# Patient Record
Sex: FEMALE | Race: WHITE | NOT HISPANIC OR LATINO
[De-identification: names, ages, dates, MRNs, and addresses within clinical notes are randomized per-mention and may not be internally consistent; named-entity substitution may affect disease eponyms.]

---

## 2017-01-09 ENCOUNTER — APPOINTMENT (OUTPATIENT)
Dept: ORTHOPEDIC SURGERY | Facility: CLINIC | Age: 67
End: 2017-01-09

## 2017-01-09 DIAGNOSIS — M65.331 TRIGGER FINGER, RIGHT MIDDLE FINGER: ICD-10-CM

## 2017-04-13 ENCOUNTER — TRANSCRIPTION ENCOUNTER (OUTPATIENT)
Age: 67
End: 2017-04-13

## 2017-06-06 ENCOUNTER — OTHER (OUTPATIENT)
Age: 67
End: 2017-06-06

## 2017-06-06 ENCOUNTER — MOBILE ON CALL (OUTPATIENT)
Age: 67
End: 2017-06-06

## 2018-01-09 ENCOUNTER — TRANSCRIPTION ENCOUNTER (OUTPATIENT)
Age: 68
End: 2018-01-09

## 2018-05-21 ENCOUNTER — TRANSCRIPTION ENCOUNTER (OUTPATIENT)
Age: 68
End: 2018-05-21

## 2018-10-23 ENCOUNTER — TRANSCRIPTION ENCOUNTER (OUTPATIENT)
Age: 68
End: 2018-10-23

## 2021-05-03 ENCOUNTER — APPOINTMENT (OUTPATIENT)
Dept: OTOLARYNGOLOGY | Facility: CLINIC | Age: 71
End: 2021-05-03
Payer: MEDICARE

## 2021-05-03 ENCOUNTER — APPOINTMENT (OUTPATIENT)
Dept: OTOLARYNGOLOGY | Facility: CLINIC | Age: 71
End: 2021-05-03
Payer: SELF-PAY

## 2021-05-03 DIAGNOSIS — H93.12 TINNITUS, LEFT EAR: ICD-10-CM

## 2021-05-03 DIAGNOSIS — Z87.39 PERSONAL HISTORY OF OTHER DISEASES OF THE MUSCULOSKELETAL SYSTEM AND CONNECTIVE TISSUE: ICD-10-CM

## 2021-05-03 PROCEDURE — 92567 TYMPANOMETRY: CPT

## 2021-05-03 PROCEDURE — 92557 COMPREHENSIVE HEARING TEST: CPT

## 2021-05-03 PROCEDURE — 99203 OFFICE O/P NEW LOW 30 MIN: CPT

## 2021-05-03 PROCEDURE — V5010 ASSESSMENT FOR HEARING AID: CPT

## 2021-05-03 RX ORDER — METOPROLOL SUCCINATE 25 MG/1
25 TABLET, EXTENDED RELEASE ORAL
Qty: 180 | Refills: 0 | Status: ACTIVE | COMMUNITY
Start: 2021-04-30

## 2021-05-03 RX ORDER — ROSUVASTATIN CALCIUM 10 MG/1
10 TABLET, FILM COATED ORAL
Qty: 90 | Refills: 0 | Status: ACTIVE | COMMUNITY
Start: 2021-04-06

## 2021-05-03 NOTE — CONSULT LETTER
[Dear  ___] : Dear  [unfilled], [Consult Letter:] : I had the pleasure of evaluating your patient, [unfilled]. [Please see my note below.] : Please see my note below. [Consult Closing:] : Thank you very much for allowing me to participate in the care of this patient.  If you have any questions, please do not hesitate to contact me. [Sincerely,] : Sincerely, [FreeTextEntry3] : Krystal Sequeira MD\par

## 2021-05-03 NOTE — HISTORY OF PRESENT ILLNESS
[de-identified] : WILLIAM CENTENO is a 70 year patient With a history of hearing loss. She said that she has a long history of hearing loss in the left ear. She does not recall a preceding event. In the fall, she saw Dr. Diane, for ENT at [facility] for evaluation for dizziness. She was diagnosed with a bilateral sensorineural hearing loss with asymmetry of the left ear. She said that the dizziness has improved. She described a spinning sensation.  the episodes could last all day. She has not had an episode recently.  She feels a fullness in the ear and may have tinnitus. She has no otalgia or otorrhea. She denies a history of headaches or known change in her hearing. She has not been using hearing aids. She does have a history of autoimmune disease and is on prednisone. She states she was diagnosed with Ménière's disease. She denies a history of recurrent ear infections, prior otologic surgery, or ear/head trauma. She does have noise exposure from using headphones. She said that her sister has Ménière's disease.  I reviewed her audiogram from [facility] from October. She said that an MRI was negative for masses.

## 2021-05-03 NOTE — ASSESSMENT
[FreeTextEntry1] : She has bilateral sensorineural hearing loss with asymmetry in the left ear.  She had also episode of dizziness but those have improved and she has not had one in a while. She may have Ménière's disease. She does have autoimmune disease. \par \par PLAN\par \par -findings and management options discussed in detail with the patient. \par -good aural hygiene\par -avoid using cotton swabs in the ears\par -noise precautions\par -monitor hearing\par -I recommended low salt diet and avoidance of caffeine, alcohol and nicotine. She may take meclizine as needed for severe vertigo. If she has recurrent dizziness that is frequent or severe, we would also discuss trying a diuretic\par -I recommended hearing aid evaluation. Depending on how she does, she could consider a BAHA.\par -follow up in 6 months if she is doing well.\par -call and return earlier if any concerns or worsening symptoms \par \par \par

## 2021-05-11 ENCOUNTER — APPOINTMENT (OUTPATIENT)
Dept: OTOLARYNGOLOGY | Facility: CLINIC | Age: 71
End: 2021-05-11
Payer: SELF-PAY

## 2021-05-11 PROCEDURE — V5010 ASSESSMENT FOR HEARING AID: CPT | Mod: NC

## 2021-05-25 ENCOUNTER — APPOINTMENT (OUTPATIENT)
Dept: OTOLARYNGOLOGY | Facility: CLINIC | Age: 71
End: 2021-05-25

## 2021-05-25 ENCOUNTER — TRANSCRIPTION ENCOUNTER (OUTPATIENT)
Age: 71
End: 2021-05-25

## 2021-06-05 ENCOUNTER — TRANSCRIPTION ENCOUNTER (OUTPATIENT)
Age: 71
End: 2021-06-05

## 2021-06-07 ENCOUNTER — APPOINTMENT (OUTPATIENT)
Dept: OTOLARYNGOLOGY | Facility: CLINIC | Age: 71
End: 2021-06-07

## 2021-06-07 ENCOUNTER — NON-APPOINTMENT (OUTPATIENT)
Age: 71
End: 2021-06-07

## 2021-06-09 ENCOUNTER — TRANSCRIPTION ENCOUNTER (OUTPATIENT)
Age: 71
End: 2021-06-09

## 2021-07-13 ENCOUNTER — APPOINTMENT (OUTPATIENT)
Dept: OTOLARYNGOLOGY | Facility: CLINIC | Age: 71
End: 2021-07-13
Payer: SELF-PAY

## 2021-07-13 PROCEDURE — V5011: CPT | Mod: NC

## 2021-08-05 ENCOUNTER — APPOINTMENT (OUTPATIENT)
Dept: OTOLARYNGOLOGY | Facility: CLINIC | Age: 71
End: 2021-08-05
Payer: MEDICARE

## 2021-08-05 PROCEDURE — 99212 OFFICE O/P EST SF 10 MIN: CPT | Mod: 95

## 2021-08-05 RX ORDER — PREDNISONE 2.5 MG/1
2.5 TABLET ORAL
Qty: 30 | Refills: 0 | Status: COMPLETED | COMMUNITY
Start: 2020-12-27 | End: 2021-08-05

## 2021-08-05 RX ORDER — PREDNISONE 5 MG/1
5 TABLET ORAL
Qty: 90 | Refills: 0 | Status: COMPLETED | COMMUNITY
Start: 2020-11-10 | End: 2021-08-05

## 2021-08-05 RX ORDER — FUROSEMIDE 20 MG/1
20 TABLET ORAL
Qty: 3 | Refills: 0 | Status: COMPLETED | COMMUNITY
Start: 2021-02-05 | End: 2021-08-05

## 2021-08-05 RX ORDER — DILTIAZEM HYDROCHLORIDE 240 MG/1
240 CAPSULE, EXTENDED RELEASE ORAL
Qty: 90 | Refills: 0 | Status: COMPLETED | COMMUNITY
Start: 2020-08-19 | End: 2021-08-05

## 2021-08-05 RX ORDER — SUCRALFATE 1 G/1
1 TABLET ORAL
Qty: 56 | Refills: 0 | Status: COMPLETED | COMMUNITY
Start: 2021-02-05 | End: 2021-08-05

## 2021-08-05 RX ORDER — PANTOPRAZOLE 40 MG/1
40 TABLET, DELAYED RELEASE ORAL
Qty: 30 | Refills: 0 | Status: COMPLETED | COMMUNITY
Start: 2021-02-05 | End: 2021-08-05

## 2021-08-05 RX ORDER — PREDNISONE 1 MG/1
1 TABLET ORAL
Qty: 120 | Refills: 0 | Status: COMPLETED | COMMUNITY
Start: 2021-01-29 | End: 2021-08-05

## 2021-08-05 NOTE — ASSESSMENT
[FreeTextEntry1] : She has had recurrent vertigo over the past 2 nights. She has a spinning sensation of nausea and vomiting. It lasts several hours. She also has fatigue and feels that her heart rate has increased. She does not think that she is dehydrated. She denies neurological symptoms, shortness of breath and chest pain. She also denies a change in her hearing, tinnitus, or fullness in the ear.\par \par Plan\par -Findings and management options were discussed with the patient.\par - I have asked her to call her cardiologist to discuss her symptoms. I am concerned because of the elevated heart rate. She should also reach out to her PCP. She said that he is away\par -She may try meclizine. I sent in a new prescription as her may be \par -Continue low salt diet and avoidance of caffeine, alcohol, and nicotine.\par -consider trying a diuretic depending on how she does. We would need to speak to her cardiologist first\par -She could try lose dose Valium if needed. She said that she has Xanax to take at home to take as needed\par -I to told her to go to the ER if she has severe symptoms or any concerns.\par -I asked her to call me tomorrow to let me know how she is doing. I would like to see her in the office when she can come in.

## 2021-08-05 NOTE — HISTORY OF PRESENT ILLNESS
[Home] : at home, [unfilled] , at the time of the visit. [Medical Office: ([facility])___] : at the medical office located in  [Verbal consent obtained from patient] : the patient, [unfilled] [de-identified] : Visit initiated at patient request.  This is an audio/visual (using [employer]) visit. There are limitations of telemedicine encounters, including the risks associated with the technology platform  and a limited physical exam.  There is also a limitation in performing diagnostic procedures and patient may need further testing and work up to arrive at a diagnosis and treatment plan. She was told when she scheduled an appointment that this would be billed as a visit\par \kendall WILLIAM CENTENO is a 70 year patient With a history of hearing loss and Ménière's disease. She said that on Tuesday night at 11 PM, she had a severe episode of vertigo with nausea. It improved somewhat after a while. She then had a recurrent episode last night along with nausea and vomiting. Again, it lasted a while and improved.  She was not able to come into the office for evaluation. She also has fatigue. She has no change in her hearing, fullness, tinnitus, visual changes, headaches, or lightheadedness. She has noticed an increase in her heart rate. She has not tried meclizine. She had been on steroids at her last visit but has been weaned off them. She has not been sick. She denies neurological symptoms. She has not yet spoken with her PCP or cardiologist.\par

## 2021-08-06 ENCOUNTER — APPOINTMENT (OUTPATIENT)
Dept: OTOLARYNGOLOGY | Facility: CLINIC | Age: 71
End: 2021-08-06
Payer: MEDICARE

## 2021-08-06 DIAGNOSIS — H90.A32 MIXED CONDUCTIVE AND SENSORINEURAL HEARING, UNILATERAL, LEFT EAR WITH RESTRICTED HEARING ON THE  CONTRALATERAL SIDE: ICD-10-CM

## 2021-08-06 PROCEDURE — 92567 TYMPANOMETRY: CPT

## 2021-08-06 PROCEDURE — 92557 COMPREHENSIVE HEARING TEST: CPT

## 2021-08-06 PROCEDURE — 99214 OFFICE O/P EST MOD 30 MIN: CPT

## 2021-08-06 NOTE — HISTORY OF PRESENT ILLNESS
[de-identified] : WILLIAM CENTENO is a 70 year patient With a history of left Ménière's disease. She has hearing loss in that ear. She started having severe episodes of vertigo 11:00 at night starting Tuesday which last several hours. Her left ear feels like there is fullness but it is unchanged. She tried taking 12.5 mg of meclizine before bed. The nausea and vomiting are bit less but she still had an episode of dizziness. She also took a low dose Xanax which helped. She also felt like her heart was racing. She spoke with her cardiologist who recommended evaluation and possible Holter monitoring.  She has no other symptoms. \par \par ENT HIstory\par She has left Meniere's disease.  She was seen at [facility]. MRI negative for masses per the patient\par No recurrent ear infections, prior otologic surgery, or ear/head trauma\par She has history of noise exposure. \par she has autoimmune disease. \par Her sister has Meniere's disease.

## 2021-08-06 NOTE — CONSULT LETTER
[Dear  ___] : Dear  [unfilled], [Courtesy Letter:] : I had the pleasure of seeing your patient, [unfilled], in my office today. [Please see my note below.] : Please see my note below. [Consult Closing:] : Thank you very much for allowing me to participate in the care of this patient.  If you have any questions, please do not hesitate to contact me. [Sincerely,] : Sincerely, [FreeTextEntry3] : Krystal Sequeira MD\par

## 2021-08-06 NOTE — ASSESSMENT
[FreeTextEntry1] : She has a history of left Ménière's disease. She has been having episodes of severe vertigo at night which last several hours. This may be an exacerbation of Meniere's. Her ears were normal on exam and audiogram showed no significant change although the word understanding was a little bit worse on the left side. I discussed the possibility of benign positional vertigo. She opted to hold off on Perris-Hallpike testing today since she was by herself.\par \par PLAN\par \par -findings and management options discussed in detail with the patient. \par -good aural hygiene\par -monitor hearing\par -low salt diet (she was given literature regarding the salt content of foods) and avoidance of caffeine, alcohol and nicotine.\par -she may try taking 25 mg of meclizine as needed for the dizziness. She has Xanax as well although she should avoid taking them together because of the sedation effect\par -I spoke with her cardiologist after the visit. He said it would be okay to try Dyazide. I will have her contacted. She should review the side effects. She needs to be cautious about the potassium content of foods.\par -She does not wish to take oral steroids. She may consider intratympanic steroid injection.\par -I am sending her for VNG and ECoG testing. If positional vertigo is found, vestibular therapy would be helpful\par -She may consider intratympanic gentamycin injection. I will have her see my colleague, Dr. Young, an otologist who performs the injection \par -Followup with her cardiologist although he thought it was less likely to be cardiac in nature as it occurs typically at night at 11 PM\par -We'll see how she is doing next week. I will have her call me Monday with an update. \par -Call if she has any worsening symptoms or concerns. She has any issues, she should also consider going to the emergency room\par \par \par

## 2021-08-09 ENCOUNTER — NON-APPOINTMENT (OUTPATIENT)
Age: 71
End: 2021-08-09

## 2021-08-11 ENCOUNTER — APPOINTMENT (OUTPATIENT)
Dept: OTOLARYNGOLOGY | Facility: CLINIC | Age: 71
End: 2021-08-11
Payer: MEDICARE

## 2021-08-11 DIAGNOSIS — R42 DIZZINESS AND GIDDINESS: ICD-10-CM

## 2021-08-11 PROCEDURE — 99442: CPT | Mod: 95

## 2021-08-11 NOTE — HISTORY OF PRESENT ILLNESS
[de-identified] : Telephone visit.  There are limitations of telemedicine encounters including the risks associated with the technology platform and lack of a physical exam.  The patient may need further testing and work up to arrive at a diagnosis and treatment plan.  The patient was made aware when the appointment was scheduled that this will be billed as a visit.  The patient understood and elected to proceed.\par \par WILLIAM CENTENO is a 70 year patient With a history of left Ménière's disease. She had exacerbation of her symptoms with vertigo occurring nightly for several days. She did go for vestibular therapy evaluation. I reviewed the consult. She has been feeling better over the past 2 days. Her last attack was Sunday night. She feels normal at this time. She took meclizine which did help. She took the diuretic for one day day but stopped it because she felt better. She is on a low-salt diet.

## 2021-08-11 NOTE — ASSESSMENT
Unable to leave . A no phone response letter has been formulated due to not being able to leave . [FreeTextEntry1] : She has a history of Ménière's disease. She has been feeling better. Her last episode was Sunday night..\par \par Plan\par -Findings and management options were discussed with the patient.\par - monitor hearing\par - low salt diet\par - I recommend she start the diuretic if she has recurrent dizziness and take it daily for one month\par -Continue low salt diet and avoidance of caffeine, alcohol, and nicotine.\par - vestibular therapy and/or home exercises\par - She would like to hold off on VNG and ECoG testing as it could cause exacerbation of dizziness. If she does have recurrent dizziness, I recommend she proceed with the testing\par - I also asked her to consider intratympanic steroid injection if she has recurrent dizziness. She does not wish to take oral steroids, she may also consider intratympanic gentamycin injection if her symptoms do not respond.\par -She will call me if she has recurrent symptoms. If she is doing well, I recommend followup in 3 months

## 2021-08-24 ENCOUNTER — TRANSCRIPTION ENCOUNTER (OUTPATIENT)
Age: 71
End: 2021-08-24

## 2021-08-24 ENCOUNTER — APPOINTMENT (OUTPATIENT)
Dept: OTOLARYNGOLOGY | Facility: CLINIC | Age: 71
End: 2021-08-24

## 2021-09-16 ENCOUNTER — TRANSCRIPTION ENCOUNTER (OUTPATIENT)
Age: 71
End: 2021-09-16

## 2021-09-23 ENCOUNTER — TRANSCRIPTION ENCOUNTER (OUTPATIENT)
Age: 71
End: 2021-09-23

## 2021-10-20 ENCOUNTER — NON-APPOINTMENT (OUTPATIENT)
Age: 71
End: 2021-10-20

## 2021-10-21 ENCOUNTER — NON-APPOINTMENT (OUTPATIENT)
Age: 71
End: 2021-10-21

## 2021-10-26 ENCOUNTER — APPOINTMENT (OUTPATIENT)
Dept: OTOLARYNGOLOGY | Facility: CLINIC | Age: 71
End: 2021-10-26
Payer: SELF-PAY

## 2021-10-26 PROCEDURE — V5261I: CUSTOM

## 2021-11-09 ENCOUNTER — APPOINTMENT (OUTPATIENT)
Dept: OTOLARYNGOLOGY | Facility: CLINIC | Age: 71
End: 2021-11-09
Payer: SELF-PAY

## 2021-11-09 PROCEDURE — 92593: CPT | Mod: NC

## 2021-11-15 ENCOUNTER — APPOINTMENT (OUTPATIENT)
Dept: OTOLARYNGOLOGY | Facility: CLINIC | Age: 71
End: 2021-11-15
Payer: MEDICARE

## 2021-11-15 PROCEDURE — 69210 REMOVE IMPACTED EAR WAX UNI: CPT

## 2021-11-15 PROCEDURE — 92557 COMPREHENSIVE HEARING TEST: CPT

## 2021-11-15 PROCEDURE — 92567 TYMPANOMETRY: CPT

## 2021-11-15 PROCEDURE — 99214 OFFICE O/P EST MOD 30 MIN: CPT | Mod: 25

## 2021-11-15 NOTE — CONSULT LETTER
[Please see my note below.] : Please see my note below. [FreeTextEntry2] : Dear DENNY NORMAN  [FreeTextEntry1] : Thank you for allowing me to participate in the care of WILLIAM CENTENO .\par Please see the attached visit note.\par \par \par \par Sha Young\par Otology\par Medical Director of [facility]\par Department of Otolaryngology\par [facility]

## 2021-11-15 NOTE — ASSESSMENT
[FreeTextEntry1] : Symptoms are most consistent with Ménière's disease in the left ear. Recent onset of vertigo appears to have occurred in the face of a pre-existing sensory neural hearing loss of uncertain etiology or duration.\par \par I have requested an opportunity to review a previous MRI which was reportedly normal.\par \par I have reinforced a low-sodium diet. I have recommended a trial of diuretics and have provided her with abortive medication for vertigo. Attacks.\par \par Consideration of other interventions were discussed if symptoms persist or progress.\par \par Followup recommended in 6 weeks.\par \par Time based billing:  I have spent 30 minutes of time, excluding procedures on this encounter.

## 2021-11-15 NOTE — REASON FOR VISIT
[Initial Consultation] : an initial consultation for [Hearing Loss] : hearing loss [Vertigo] : vertigo

## 2021-11-15 NOTE — HISTORY OF PRESENT ILLNESS
[de-identified] : WILLIAM CENTENO has a history of Meniere's disease for several years in or about 2020.  Episodic vertigo began. \par reports 2 severe attacks in the past 6 months. Gradual hearing noise of unknown duration. \par No tinnitus. No hearing fluctuations\par \par reports negative MRI recently\par FH: sister has Meniere's disease

## 2021-11-15 NOTE — DATA REVIEWED
[de-identified] : In order to investigate current symptoms, Complete audiometry was ordered and completed today. I have interpreted these results and reviewed them in detail with the patient.\par \par

## 2021-11-16 ENCOUNTER — APPOINTMENT (OUTPATIENT)
Dept: OTOLARYNGOLOGY | Facility: CLINIC | Age: 71
End: 2021-11-16

## 2021-11-18 ENCOUNTER — APPOINTMENT (OUTPATIENT)
Dept: OTOLARYNGOLOGY | Facility: CLINIC | Age: 71
End: 2021-11-18

## 2021-11-30 ENCOUNTER — APPOINTMENT (OUTPATIENT)
Dept: OTOLARYNGOLOGY | Facility: CLINIC | Age: 71
End: 2021-11-30
Payer: SELF-PAY

## 2021-11-30 PROCEDURE — 92593: CPT | Mod: NC

## 2021-12-03 ENCOUNTER — APPOINTMENT (OUTPATIENT)
Dept: OTOLARYNGOLOGY | Facility: CLINIC | Age: 71
End: 2021-12-03
Payer: SELF-PAY

## 2021-12-03 PROCEDURE — 92593: CPT | Mod: NC

## 2021-12-07 ENCOUNTER — APPOINTMENT (OUTPATIENT)
Dept: OTOLARYNGOLOGY | Facility: CLINIC | Age: 71
End: 2021-12-07

## 2021-12-10 ENCOUNTER — NON-APPOINTMENT (OUTPATIENT)
Age: 71
End: 2021-12-10

## 2021-12-22 ENCOUNTER — APPOINTMENT (OUTPATIENT)
Dept: OTOLARYNGOLOGY | Facility: CLINIC | Age: 71
End: 2021-12-22
Payer: MEDICARE

## 2021-12-22 VITALS — TEMPERATURE: 97.6 F | HEIGHT: 61 IN | WEIGHT: 145 LBS | BODY MASS INDEX: 27.38 KG/M2

## 2021-12-22 PROCEDURE — 92557 COMPREHENSIVE HEARING TEST: CPT

## 2021-12-22 PROCEDURE — 99213 OFFICE O/P EST LOW 20 MIN: CPT

## 2021-12-22 PROCEDURE — 92567 TYMPANOMETRY: CPT

## 2021-12-22 NOTE — ASSESSMENT
[FreeTextEntry1] : Significantly improved symptoms. Following the onset of diuretic use and dietary restriction. I have recommended continuing this regimen. Clinical monitoring recommended.\par \par Followup in 3 months with repeat audiometry.

## 2021-12-22 NOTE — HISTORY OF PRESENT ILLNESS
[de-identified] : WILLIAM CENTENO has a history of Meniere's disease for several years in or about 2020.  Episodic vertigo began. \par reports 2 severe attacks in the past 6 months. Gradual hearing noise of unknown duration. \par No tinnitus. No hearing fluctuations\par \par reports negative MRI recently\par FH: sister has Meniere's disease [FreeTextEntry1] : 12/22/2021 \par No reported vertigo or any mild dizziness. \par compliant with dietary changes and diuretic.

## 2021-12-22 NOTE — DATA REVIEWED
[de-identified] : In order to investigate current symptoms, Complete audiometry was ordered and completed today. I have interpreted these results and reviewed them in detail with the patient.\par \par mproved hearing in the left ear

## 2021-12-23 ENCOUNTER — NON-APPOINTMENT (OUTPATIENT)
Age: 71
End: 2021-12-23

## 2021-12-28 ENCOUNTER — TRANSCRIPTION ENCOUNTER (OUTPATIENT)
Age: 71
End: 2021-12-28

## 2022-01-04 ENCOUNTER — NON-APPOINTMENT (OUTPATIENT)
Age: 72
End: 2022-01-04

## 2022-01-08 ENCOUNTER — TRANSCRIPTION ENCOUNTER (OUTPATIENT)
Age: 72
End: 2022-01-08

## 2022-01-12 ENCOUNTER — APPOINTMENT (OUTPATIENT)
Dept: OTOLARYNGOLOGY | Facility: CLINIC | Age: 72
End: 2022-01-12
Payer: MEDICARE

## 2022-01-12 ENCOUNTER — APPOINTMENT (OUTPATIENT)
Dept: OTOLARYNGOLOGY | Facility: CLINIC | Age: 72
End: 2022-01-12

## 2022-01-12 VITALS — BODY MASS INDEX: 27.38 KG/M2 | HEIGHT: 61 IN | TEMPERATURE: 97.3 F | WEIGHT: 145 LBS

## 2022-01-12 PROCEDURE — 92557 COMPREHENSIVE HEARING TEST: CPT

## 2022-01-12 PROCEDURE — 92567 TYMPANOMETRY: CPT

## 2022-01-12 PROCEDURE — 99214 OFFICE O/P EST MOD 30 MIN: CPT

## 2022-01-12 NOTE — CONSULT LETTER
[Please see my note below.] : Please see my note below. [FreeTextEntry2] : Dear DENNY NORMAN  [FreeTextEntry1] : Thank you for allowing me to participate in the care of WILLIAM CENTENO .\par Please see the attached visit note.\par \par \par \par Sha Young\par Otology\par Medical Director of [facility]\par Department of Otolaryngology\par [facility]

## 2022-01-12 NOTE — DATA REVIEWED
[de-identified] : In order to investigate current symptoms, Complete audiometry was ordered and completed today. I have interpreted these results and reviewed them in detail with the patient.\par \par fluctuating hearing loss, progressive in the left ear

## 2022-01-12 NOTE — ASSESSMENT
[FreeTextEntry1] : Recurrent symptoms of Ménière's disease in the left ear causing severe vertigo and fall. We spent time discussing the strategies for managing Ménière's disease.\par \par I have recommended continued use of diuretic and low sodium diet.\par \par We discussed the option of intratympanic steroid injection. She wishes to return for this.All risks limitations, complications and alternatives reviewed in detail.  Questions answered.\par \par Further management will be dictated by symptoms. Following intratympanic steroids. I have recommended a trial with beta histine.\par \par Followup will be made in the near future.\par I have again requested the results of prior MRI imaging.\par \par Time based billing:  I have spent 30 minutes of time, excluding procedures on this encounter.

## 2022-01-12 NOTE — REASON FOR VISIT
[Subsequent Evaluation] : a subsequent evaluation for [Hearing Loss] : hearing loss [Vertigo] : vertigo [FreeTextEntry2] : meniere's disease

## 2022-01-12 NOTE — HISTORY OF PRESENT ILLNESS
[de-identified] : WILLIAM CENTENO has a history of Meniere's disease for several years in or about 2020.  Episodic vertigo began. \par No tinnitus. No hearing fluctuations\par \par reports negative MRI recently\par FH: sister has Meniere's disease [FreeTextEntry1] : 01/12/2022\par 5 days ago had severe attack of vertigo. Fell on the street with blunt head injury. Duration of attack was approximately 20 minutes. CT head reportedly wnl.  3 days ago developed recurrent vertigo with motion intolerance.  Nausea.  Duration was for several hours. No perceived change in hearing reported.  No Tinnitus. Ear fullness uncertain temporal relationship. \par

## 2022-02-04 ENCOUNTER — APPOINTMENT (OUTPATIENT)
Dept: OTOLARYNGOLOGY | Facility: CLINIC | Age: 72
End: 2022-02-04
Payer: MEDICARE

## 2022-02-04 PROCEDURE — 92593: CPT | Mod: NC

## 2022-02-14 ENCOUNTER — APPOINTMENT (OUTPATIENT)
Dept: OTOLARYNGOLOGY | Facility: CLINIC | Age: 72
End: 2022-02-14
Payer: MEDICARE

## 2022-02-14 PROCEDURE — 99212 OFFICE O/P EST SF 10 MIN: CPT | Mod: 95

## 2022-02-14 NOTE — ASSESSMENT
[FreeTextEntry1] : Now improved with low sodium diet and beta histine. \par I have recommend continued use of beta histine, and continued clinical monitoring. Low sodium diet.\par We discussed the careful use of benzodiazepines, which have been successful for her. She has been judicious in its use and I have cautioned her to be careful. She agrees.\par \par \par Followup recommended in approximately one month with repeat audiometry recommended.

## 2022-02-14 NOTE — HISTORY OF PRESENT ILLNESS
[Home] : at home, [unfilled] , at the time of the visit. [Medical Office: ([facility])___] : at the medical office located in  [Verbal consent obtained from patient] : the patient, [unfilled] [de-identified] : WILLIAM CENTENO has a history of Meniere's disease for several years in or about 2020.  Episodic vertigo began. \par No tinnitus. No hearing fluctuations\par \par reports negative MRI recently\par FH: sister has Meniere's disease [FreeTextEntry1] : 02/14/2022 \par Beta histine 'is working really well'.  Now on the new med 2- 3 weeks. 1 bad episode without nausea.    Has used Ativan successfully to abort attacks.   No tinnitus.  Fullness is better.\par

## 2022-03-28 ENCOUNTER — APPOINTMENT (OUTPATIENT)
Dept: OTOLARYNGOLOGY | Facility: CLINIC | Age: 72
End: 2022-03-28
Payer: MEDICARE

## 2022-03-28 PROCEDURE — 99213 OFFICE O/P EST LOW 20 MIN: CPT

## 2022-03-28 PROCEDURE — 92557 COMPREHENSIVE HEARING TEST: CPT

## 2022-03-28 PROCEDURE — 92567 TYMPANOMETRY: CPT

## 2022-03-28 RX ORDER — METHOTREXATE 2.5 MG/1
2.5 TABLET ORAL
Qty: 24 | Refills: 0 | Status: DISCONTINUED | COMMUNITY
Start: 2021-02-05 | End: 2022-03-28

## 2022-03-28 RX ORDER — TRAZODONE HYDROCHLORIDE 50 MG/1
50 TABLET ORAL
Qty: 30 | Refills: 0 | Status: DISCONTINUED | COMMUNITY
Start: 2021-04-06 | End: 2022-03-28

## 2022-03-28 RX ORDER — ALBUTEROL SULFATE 90 UG/1
108 (90 BASE) AEROSOL, METERED RESPIRATORY (INHALATION)
Qty: 8 | Refills: 0 | Status: DISCONTINUED | COMMUNITY
Start: 2021-02-19 | End: 2022-03-28

## 2022-03-28 NOTE — DATA REVIEWED
[de-identified] : In light of the patients current symptoms, Complete audiometry was ordered and completed today. I have interpreted these results and reviewed them in detail with the patient.\par \par Asymmetric sensorineural hearing loss.

## 2022-03-28 NOTE — HISTORY OF PRESENT ILLNESS
[de-identified] : WILLIAM CENTENO has a history of Meniere's disease for several years in or about 2020.  Episodic vertigo began. \par No tinnitus. No hearing fluctuations\par \par reports negative MRI recently\par FH: sister has Meniere's disease [FreeTextEntry1] : 03/28/2022 \par Feels that beta histine is working well. No recurrent vertigo at all.  Feels off balance that no tinnitus reported.  Currently using beta Histine with good reported benefit.  Also using diuretic twice a day although it was prescribed daily\par

## 2022-03-28 NOTE — PHYSICAL EXAM
[Normal] : temporomandibular joint is normal [FreeTextEntry1] : Procedure: Microscopic Ear Exam\par \par Left ear:  Ear canal intact without inflammation or lesion.  \par Tympanic membrane intact without inflammation.\par \par Right ear:  Ear canal intact without inflammation or lesion.  \par Tympanic membrane intact without inflammation.\par

## 2022-03-28 NOTE — ASSESSMENT
[FreeTextEntry1] : Improved symptoms of vertigo following treatment for Ménière's disease with oral beta Histine and oral diuretic.  The patient disclosed that she is accidentally taking double dose of the diuretic.  I have reinstructed her in this and have recommended that she review this with her primary care physician.\par \par Significant asymmetric hearing loss is present.  Management options discussed.\par \par

## 2022-03-28 NOTE — CONSULT LETTER
[Please see my note below.] : Please see my note below. [FreeTextEntry2] : Dear DENNY NORMAN  [FreeTextEntry1] : Thank you for allowing me to participate in the care of WILLIAM CENTENO .\par Please see the attached visit note.\par \par \par \par Sha Young\par Otology\par Medical Director of [facility]\par Department of Otolaryngology\par [facility]

## 2022-05-05 ENCOUNTER — NON-APPOINTMENT (OUTPATIENT)
Age: 72
End: 2022-05-05

## 2022-06-10 ENCOUNTER — NON-APPOINTMENT (OUTPATIENT)
Age: 72
End: 2022-06-10

## 2022-06-14 ENCOUNTER — NON-APPOINTMENT (OUTPATIENT)
Age: 72
End: 2022-06-14

## 2022-08-01 ENCOUNTER — NON-APPOINTMENT (OUTPATIENT)
Age: 72
End: 2022-08-01

## 2022-08-12 ENCOUNTER — NON-APPOINTMENT (OUTPATIENT)
Age: 72
End: 2022-08-12

## 2022-08-25 ENCOUNTER — APPOINTMENT (OUTPATIENT)
Dept: OTOLARYNGOLOGY | Facility: CLINIC | Age: 72
End: 2022-08-25

## 2022-08-25 VITALS — WEIGHT: 145 LBS | BODY MASS INDEX: 27.38 KG/M2 | HEIGHT: 61 IN | TEMPERATURE: 97.3 F

## 2022-08-25 DIAGNOSIS — H90.3 SENSORINEURAL HEARING LOSS, BILATERAL: ICD-10-CM

## 2022-08-25 PROCEDURE — 92567 TYMPANOMETRY: CPT

## 2022-08-25 PROCEDURE — 92557 COMPREHENSIVE HEARING TEST: CPT

## 2022-08-25 PROCEDURE — G0268 REMOVAL OF IMPACTED WAX MD: CPT

## 2022-08-25 PROCEDURE — 99213 OFFICE O/P EST LOW 20 MIN: CPT | Mod: 25

## 2022-08-25 NOTE — HISTORY OF PRESENT ILLNESS
[de-identified] : WILLIAM CENTENO has a history of Meniere's disease for several years in or about 2020.  Episodic vertigo began. \par No tinnitus. No hearing fluctuations\par \par reports negative MRI recently\par FH: sister has Meniere's disease [FreeTextEntry1] : 08/25/2022 \par Patient reports of improved hearing and no dizziness and no vertigo. Using beta histine with good benefit.

## 2022-08-25 NOTE — REASON FOR VISIT
[Subsequent Evaluation] : a subsequent evaluation for [Vertigo] : vertigo [FreeTextEntry2] : Menieres Disease

## 2022-08-25 NOTE — ASSESSMENT
[FreeTextEntry1] : Ear hygiene reviewed in detail.  Follow up recommended if symptoms persist or progresses.  Routine follow up for cerumen management suggested.\par \par Follow-up with audiologist for hearing technology adjustments.\par \par Continued use of low-sodium diet and beta histine.  Follow-up recommended in 6 months.  With repeat audiometry

## 2022-08-25 NOTE — CONSULT LETTER
[Please see my note below.] : Please see my note below. [FreeTextEntry2] : Dear DENNY NORMAN  [FreeTextEntry1] : Thank you for allowing me to participate in the care of WILLIAM CENTENO .\par Please see the attached visit note.\par \par \par \par Sha Young\par Otology\par Medical Director of [facility]\par Department of Otolaryngology\par [facility]

## 2022-08-25 NOTE — DATA REVIEWED
[de-identified] : In light of the patients current symptoms, Complete audiometry was ordered and completed today. I have interpreted these results and reviewed them in detail with the patient.\par \par Improved thresholds in the left ear with poor speech recognition

## 2022-10-01 ENCOUNTER — NON-APPOINTMENT (OUTPATIENT)
Age: 72
End: 2022-10-01

## 2022-11-01 ENCOUNTER — APPOINTMENT (OUTPATIENT)
Dept: OTOLARYNGOLOGY | Facility: CLINIC | Age: 72
End: 2022-11-01

## 2022-11-01 PROCEDURE — 99212 OFFICE O/P EST SF 10 MIN: CPT | Mod: 95

## 2022-11-01 NOTE — ASSESSMENT
[FreeTextEntry1] : Recent episode of acute vertigo which may have been positionally precipitated.  It is unclear whether this is a manifestation of her Ménière's disease or another etiology such as benign positional vertigo.  I have recommended continued use of medication.  I have recommended early follow-up if symptoms recur.  Consideration for vestibular physical therapy is possible.

## 2022-11-01 NOTE — HISTORY OF PRESENT ILLNESS
[Home] : at home, [unfilled] , at the time of the visit. [Medical Office: ([facility])___] : at the medical office located in  [Verbal consent obtained from patient] : the patient, [unfilled] [de-identified] : WILLIAM CENTENO has a history of Meniere's disease for several years in or about 2020.  Episodic vertigo began. \par No tinnitus. No hearing fluctuations\par \par reports negative MRI recently\par FH: sister has Meniere's disease [FreeTextEntry1] : \par November 1, 2022: Patient reports sudden vertigo which occurred 5 days ago after a exercise class.  Her symptoms lasted for less than an hour and were not associated with nausea.  No simultaneous hearing changes or tinnitus reported.  No simultaneous headache reported.  The patient has returned to her baseline without symptoms at this time.

## 2023-01-18 ENCOUNTER — APPOINTMENT (OUTPATIENT)
Dept: OTOLARYNGOLOGY | Facility: CLINIC | Age: 73
End: 2023-01-18
Payer: MEDICARE

## 2023-01-18 PROCEDURE — 99212 OFFICE O/P EST SF 10 MIN: CPT | Mod: 95

## 2023-01-18 NOTE — ASSESSMENT
[FreeTextEntry1] : Single episode of vertigo reported approximately 1 week ago following ingestion of salty soup.  This has not recurred.  No new auditory symptoms.  Hearing has not changed.\par \par I have recommended resumption of a low-sodium diet.  She is now home and able to start betahistine again.  I have recommended follow-up when possible after her orthopedic recovery.

## 2023-01-18 NOTE — HISTORY OF PRESENT ILLNESS
[Home] : at home, [unfilled] , at the time of the visit. [Medical Office: ([facility])___] : at the medical office located in  [Verbal consent obtained from patient] : the patient, [unfilled] [de-identified] : WILLIAM CENTENO has a history of Meniere's disease for several years in or about 2020.  Episodic vertigo began. \par No tinnitus. No hearing fluctuations\par \par reports negative MRI recently\par FH: sister has Meniere's disease [FreeTextEntry1] : 01/18/2023 \par Recent illness for pneumonia after ortho surgery in Dec 2022.  Stopped beta histine for weeks.  bad vertigo attack 1 week ago. No auditory symptoms noted.  No tinnitus or changes in hearing. This has now resolved.

## 2023-01-31 ENCOUNTER — EMERGENCY (EMERGENCY)
Facility: HOSPITAL | Age: 73
LOS: 1 days | Discharge: HOME | End: 2023-01-31
Attending: EMERGENCY MEDICINE | Admitting: EMERGENCY MEDICINE
Payer: MEDICARE

## 2023-01-31 VITALS
TEMPERATURE: 98 F | SYSTOLIC BLOOD PRESSURE: 194 MMHG | DIASTOLIC BLOOD PRESSURE: 68 MMHG | RESPIRATION RATE: 16 BRPM | HEIGHT: 71 IN | OXYGEN SATURATION: 100 % | WEIGHT: 139.99 LBS | HEART RATE: 59 BPM

## 2023-01-31 VITALS
HEART RATE: 64 BPM | RESPIRATION RATE: 16 BRPM | OXYGEN SATURATION: 97 % | TEMPERATURE: 98 F | SYSTOLIC BLOOD PRESSURE: 160 MMHG | DIASTOLIC BLOOD PRESSURE: 55 MMHG

## 2023-01-31 DIAGNOSIS — Z96.652 PRESENCE OF LEFT ARTIFICIAL KNEE JOINT: ICD-10-CM

## 2023-01-31 DIAGNOSIS — G89.29 OTHER CHRONIC PAIN: ICD-10-CM

## 2023-01-31 DIAGNOSIS — R53.83 OTHER FATIGUE: ICD-10-CM

## 2023-01-31 DIAGNOSIS — Z20.822 CONTACT WITH AND (SUSPECTED) EXPOSURE TO COVID-19: ICD-10-CM

## 2023-01-31 DIAGNOSIS — M25.511 PAIN IN RIGHT SHOULDER: ICD-10-CM

## 2023-01-31 DIAGNOSIS — M25.562 PAIN IN LEFT KNEE: ICD-10-CM

## 2023-01-31 DIAGNOSIS — G89.11 ACUTE PAIN DUE TO TRAUMA: ICD-10-CM

## 2023-01-31 DIAGNOSIS — Z79.01 LONG TERM (CURRENT) USE OF ANTICOAGULANTS: ICD-10-CM
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
## 2023-01-31 DIAGNOSIS — I48.91 UNSPECIFIED ATRIAL FIBRILLATION: ICD-10-CM

## 2023-01-31 DIAGNOSIS — E78.5 HYPERLIPIDEMIA, UNSPECIFIED: ICD-10-CM

## 2023-01-31 DIAGNOSIS — Z96.659 PRESENCE OF UNSPECIFIED ARTIFICIAL KNEE JOINT: Chronic | ICD-10-CM

## 2023-01-31 DIAGNOSIS — H81.09 MENIERE'S DISEASE, UNSPECIFIED EAR: ICD-10-CM

## 2023-01-31 DIAGNOSIS — Z87.09 PERSONAL HISTORY OF OTHER DISEASES OF THE RESPIRATORY SYSTEM: ICD-10-CM
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
## 2023-01-31 LAB
ALBUMIN SERPL ELPH-MCNC: 4.5 G/DL — SIGNIFICANT CHANGE UP (ref 3.3–5)
ALP SERPL-CCNC: 87 U/L — SIGNIFICANT CHANGE UP (ref 40–120)
ALT FLD-CCNC: 20 U/L — SIGNIFICANT CHANGE UP (ref 10–45)
ANION GAP SERPL CALC-SCNC: 8 MMOL/L — SIGNIFICANT CHANGE UP (ref 5–17)
APPEARANCE UR: CLEAR — SIGNIFICANT CHANGE UP
APTT BLD: 36.5 SEC — HIGH (ref 27.5–35.5)
AST SERPL-CCNC: 18 U/L — SIGNIFICANT CHANGE UP (ref 10–40)
BACTERIA # UR AUTO: PRESENT /HPF
BASOPHILS # BLD AUTO: 0.07 K/UL — SIGNIFICANT CHANGE UP (ref 0–0.2)
BASOPHILS NFR BLD AUTO: 0.6 % — SIGNIFICANT CHANGE UP (ref 0–2)
BILIRUB SERPL-MCNC: 0.3 MG/DL — SIGNIFICANT CHANGE UP (ref 0.2–1.2)
BILIRUB UR-MCNC: NEGATIVE — SIGNIFICANT CHANGE UP
BUN SERPL-MCNC: 25 MG/DL — HIGH (ref 7–23)
CALCIUM SERPL-MCNC: 9.9 MG/DL — SIGNIFICANT CHANGE UP (ref 8.4–10.5)
CHLORIDE SERPL-SCNC: 104 MMOL/L — SIGNIFICANT CHANGE UP (ref 96–108)
CK MB CFR SERPL CALC: 1.4 NG/ML — SIGNIFICANT CHANGE UP (ref 0–6.7)
CK SERPL-CCNC: 54 U/L — SIGNIFICANT CHANGE UP (ref 25–170)
CO2 SERPL-SCNC: 26 MMOL/L — SIGNIFICANT CHANGE UP (ref 22–31)
COLOR SPEC: YELLOW — SIGNIFICANT CHANGE UP
CREAT SERPL-MCNC: 1.02 MG/DL — SIGNIFICANT CHANGE UP (ref 0.5–1.3)
DIFF PNL FLD: NEGATIVE — SIGNIFICANT CHANGE UP
EGFR: 58 ML/MIN/1.73M2 — LOW
EOSINOPHIL # BLD AUTO: 0.21 K/UL — SIGNIFICANT CHANGE UP (ref 0–0.5)
EOSINOPHIL NFR BLD AUTO: 1.7 % — SIGNIFICANT CHANGE UP (ref 0–6)
EPI CELLS # UR: SIGNIFICANT CHANGE UP /HPF (ref 0–5)
GLUCOSE SERPL-MCNC: 109 MG/DL — HIGH (ref 70–99)
GLUCOSE UR QL: NEGATIVE — SIGNIFICANT CHANGE UP
HCT VFR BLD CALC: 32.1 % — LOW (ref 34.5–45)
HGB BLD-MCNC: 9.9 G/DL — LOW (ref 11.5–15.5)
IMM GRANULOCYTES NFR BLD AUTO: 0.4 % — SIGNIFICANT CHANGE UP (ref 0–0.9)
INR BLD: 1.39 — HIGH (ref 0.88–1.16)
KETONES UR-MCNC: NEGATIVE — SIGNIFICANT CHANGE UP
LEUKOCYTE ESTERASE UR-ACNC: NEGATIVE — SIGNIFICANT CHANGE UP
LYMPHOCYTES # BLD AUTO: 1.99 K/UL — SIGNIFICANT CHANGE UP (ref 1–3.3)
LYMPHOCYTES # BLD AUTO: 15.9 % — SIGNIFICANT CHANGE UP (ref 13–44)
MCHC RBC-ENTMCNC: 25.9 PG — LOW (ref 27–34)
MCHC RBC-ENTMCNC: 30.8 GM/DL — LOW (ref 32–36)
MCV RBC AUTO: 84 FL — SIGNIFICANT CHANGE UP (ref 80–100)
MONOCYTES # BLD AUTO: 0.5 K/UL — SIGNIFICANT CHANGE UP (ref 0–0.9)
MONOCYTES NFR BLD AUTO: 4 % — SIGNIFICANT CHANGE UP (ref 2–14)
NEUTROPHILS # BLD AUTO: 9.68 K/UL — HIGH (ref 1.8–7.4)
NEUTROPHILS NFR BLD AUTO: 77.4 % — HIGH (ref 43–77)
NITRITE UR-MCNC: NEGATIVE — SIGNIFICANT CHANGE UP
NRBC # BLD: 0 /100 WBCS — SIGNIFICANT CHANGE UP (ref 0–0)
PH UR: 6 — SIGNIFICANT CHANGE UP (ref 5–8)
PLATELET # BLD AUTO: 439 K/UL — HIGH (ref 150–400)
POTASSIUM SERPL-MCNC: 5.3 MMOL/L — SIGNIFICANT CHANGE UP (ref 3.5–5.3)
POTASSIUM SERPL-SCNC: 5.3 MMOL/L — SIGNIFICANT CHANGE UP (ref 3.5–5.3)
PROT SERPL-MCNC: 8.2 G/DL — SIGNIFICANT CHANGE UP (ref 6–8.3)
PROT UR-MCNC: 30 MG/DL
PROTHROM AB SERPL-ACNC: 16.6 SEC — HIGH (ref 10.5–13.4)
RBC # BLD: 3.82 M/UL — SIGNIFICANT CHANGE UP (ref 3.8–5.2)
RBC # FLD: 15.4 % — HIGH (ref 10.3–14.5)
RBC CASTS # UR COMP ASSIST: < 5 /HPF — SIGNIFICANT CHANGE UP
SODIUM SERPL-SCNC: 138 MMOL/L — SIGNIFICANT CHANGE UP (ref 135–145)
SP GR SPEC: 1.02 — SIGNIFICANT CHANGE UP (ref 1–1.03)
TROPONIN T SERPL-MCNC: 0.01 NG/ML — SIGNIFICANT CHANGE UP (ref 0–0.01)
UROBILINOGEN FLD QL: 0.2 E.U./DL — SIGNIFICANT CHANGE UP
WBC # BLD: 12.5 K/UL — HIGH (ref 3.8–10.5)
WBC # FLD AUTO: 12.5 K/UL — HIGH (ref 3.8–10.5)
WBC UR QL: < 5 /HPF — SIGNIFICANT CHANGE UP

## 2023-01-31 PROCEDURE — 70450 CT HEAD/BRAIN W/O DYE: CPT | Mod: 26,MG

## 2023-01-31 PROCEDURE — 85610 PROTHROMBIN TIME: CPT

## 2023-01-31 PROCEDURE — 84484 ASSAY OF TROPONIN QUANT: CPT

## 2023-01-31 PROCEDURE — 71045 X-RAY EXAM CHEST 1 VIEW: CPT

## 2023-01-31 PROCEDURE — 99285 EMERGENCY DEPT VISIT HI MDM: CPT | Mod: 25

## 2023-01-31 PROCEDURE — 82962 GLUCOSE BLOOD TEST: CPT

## 2023-01-31 PROCEDURE — G1004: CPT

## 2023-01-31 PROCEDURE — 80053 COMPREHEN METABOLIC PANEL: CPT

## 2023-01-31 PROCEDURE — 99285 EMERGENCY DEPT VISIT HI MDM: CPT | Mod: FS

## 2023-01-31 PROCEDURE — 82550 ASSAY OF CK (CPK): CPT

## 2023-01-31 PROCEDURE — 73060 X-RAY EXAM OF HUMERUS: CPT

## 2023-01-31 PROCEDURE — 71045 X-RAY EXAM CHEST 1 VIEW: CPT | Mod: 26

## 2023-01-31 PROCEDURE — 73030 X-RAY EXAM OF SHOULDER: CPT | Mod: 26,RT

## 2023-01-31 PROCEDURE — 82553 CREATINE MB FRACTION: CPT

## 2023-01-31 PROCEDURE — 36415 COLL VENOUS BLD VENIPUNCTURE: CPT

## 2023-01-31 PROCEDURE — 70450 CT HEAD/BRAIN W/O DYE: CPT | Mod: MG

## 2023-01-31 PROCEDURE — 87637 SARSCOV2&INF A&B&RSV AMP PRB: CPT

## 2023-01-31 PROCEDURE — 93005 ELECTROCARDIOGRAM TRACING: CPT

## 2023-01-31 PROCEDURE — 85730 THROMBOPLASTIN TIME PARTIAL: CPT

## 2023-01-31 PROCEDURE — 73030 X-RAY EXAM OF SHOULDER: CPT

## 2023-01-31 PROCEDURE — 73060 X-RAY EXAM OF HUMERUS: CPT | Mod: 26,RT

## 2023-01-31 PROCEDURE — 81001 URINALYSIS AUTO W/SCOPE: CPT

## 2023-01-31 PROCEDURE — 85025 COMPLETE CBC W/AUTO DIFF WBC: CPT

## 2023-01-31 RX ORDER — LIDOCAINE 4 G/100G
1 CREAM TOPICAL ONCE
Refills: 0 | Status: COMPLETED | OUTPATIENT
Start: 2023-01-31 | End: 2023-01-31

## 2023-01-31 RX ADMIN — LIDOCAINE 1 PATCH: 4 CREAM TOPICAL at 17:40

## 2023-01-31 NOTE — ED PROVIDER NOTE - NS ED ATTENDING STATEMENT MOD
I have seen and examined this patient and fully participated in the care of this patient as the teaching attending.  The service was shared with the JAZZY.  I reviewed and verified the documentation and independently performed the documented:

## 2023-01-31 NOTE — ED ADULT NURSE REASSESSMENT NOTE - NS ED NURSE REASSESS COMMENT FT1
Assumed care of pt. 73 y/o F in ED s/p syncopal episode c/o right shoulder pain. CTh and right shoulder XR WDL. Pt ambulating independently with walker in ED and states she's ready to go home. MD Garcia made aware. Plan for d/c home.

## 2023-01-31 NOTE — ED PROVIDER NOTE - NSFOLLOWUPINSTRUCTIONS_ED_ALL_ED_FT
Thank you for visiting [facility] Emergency Department.      We saw you today for a fall.   I suspect this may have been related to your Meniere's.  Please call your Meniere's specialist tomorrow.    You may take motrin and tylenol for pain.  You may ice the shoulder throughout the day.    Please know that no emergency visit is complete without follow-up with your primary care provider in 1 week.  Please bring copies of all discharge papers and results and show to your doctor.      Please continue taking all previous medications as instructed unless we discussed otherwise.     I appreciated your patience and hope you feel better soon.     Return to ER immediately if you develop fevers, chills, chest pain, shortness of breath, worsening dizziness, and/or any concerning symptoms.

## 2023-01-31 NOTE — ED PROVIDER NOTE - OBJECTIVE STATEMENT
73 y/o female w/ hx Afib s/p ablation on eliquis, Meniere's, HLD, L knee replacement in [facility] 12/13/23 c/b pna p/w fall today.   was sitting in her bed when states suddenly felt like "the earth moved" then woke up on L side on carpeted floor in her bedroom.  Unsure what happened, but felt dizzy and was not able to get up on own (which she states was 2/2 dizziness and pain to L knee, chronic, unchanged from knee replacement).  EMS arrived shortly after and lifted pt off ground. C/o R shoulder pain and feeling fatigued.  Reports gets intermittent Meniere "attacks," which sometimes feel like dizziness/spinning, sometimes like nausea attacks, and usually feels fatigued after.  Has never had episode like today before.  Denies urinary/fecal incontinence or tongue biting.  Unsure if hit head and unsure if passed out.  Denies f/c, cough, neck pain, back pain, cp, sob, abd pain, n/v/d, dysuria, hematuria.  Typically walks with walker.

## 2023-01-31 NOTE — ED PROVIDER NOTE - PHYSICAL EXAMINATION
CONSTITUTIONAL: Awake, alert.  Nontoxic, no acute distress.    HEAD: Normocephalic, atraumatic.    EYES: Conjunctivae clear without exudates or hemorrhage. Sclera is non-icteric.  PERRL.  EOMI.    ENT: Normal appearing external ears, nose, mucous membranes moist.    NECK: supple, trachea midline.  No midline or paraspinal ttp    HEART:  Normal rate, regular rhythm.  Heart sounds S1, S2.  No murmurs, rubs or gallops.    LUNGS:  No acute respiratory distress.  Non-tachypneic and non-labored.  Lungs are clear bilaterally with good aeration.  No wheezing, rales, rhonchi.    ABDOMEN: Normal appearing skin without lesions, rashes.  Normal bowel sounds x 4.  Soft, non-distended, non-tender in all four quadrants. No rebound or guarding. No hernias or masses palpable.  No pulsatile abdominal mass.   No CVA tenderness b/l.    BACK: No midline or paraspinal ttp, no obvious deformity    MUSCULOSKELETAL:  Normal appearing extremities without obvious deformity, rash, ecchymosis, erythema.  No swelling.  Warm. +ttp R shoulder.  No other focal ttp.  No pelvic ttp.  FROM b/l upper and lower ext.  (did not range L knee).  5/5 strength b/l upper and lower ext.  Sensation and motor function grossly intact.  Strong equal peripheral pulses b/l.   Cap refill < 2 b/l upper and lower ext.  All compartments soft.    SKIN: Skin in warm, dry and intact without rashes or lesions.  Appropriate color for ethnicity.    NEUROLOGICAL:  Awake, alert and oriented x 3.  Normal speech and cognition.  Face symmetric with equal sensation to light touch throughout, PERRL, EOMI, no nystagmus, hearing grossly equal and intact b/l, no tongue deviation, intact strength upon turning head against resistance b/l, No gross motor deficit, 5/5 strength throughout, no gross sensory loss to light touch b/l upper and lower ext, normal movement.  No dysmetria on finger to nose testing.  Neg pronator drift.      PSYCH: Appropriate mood and affect. Good judgment and insight.

## 2023-01-31 NOTE — ED ADULT NURSE NOTE - CAS ELECT INFOMATION PROVIDED
Pt verbalized understanding of d/c instructions and agreed to f/u with her specialist. VSS. Pt left with her walker via [facility]./DC instructions

## 2023-01-31 NOTE — ED PROVIDER NOTE - CLINICAL SUMMARY MEDICAL DECISION MAKING FREE TEXT BOX
71 y/o female w/ hx Afib s/p ablation on eliquis, Meniere's, HLD, L knee replacement in [facility] 12/13/23 c/b pna p/w fall today.  States was sitting in her bed when states suddenly felt like "the earth moved" then woke up on L side on carpeted floor in her bedroom.  Unsure what happened, but felt dizzy and was not able to get up on own (which she states was 2/2 dizziness and pain to L knee, chronic, unchanged from knee replacement).  EMS arrived shortly after and lifted pt off ground. C/o R shoulder pain and feeling fatigued.  Reports gets intermittent Meniere "attacks," which sometimes feel like dizziness/spinning, sometimes like nausea attacks, and usually feels fatigued after.  Has never had episode like today before.  Denies urinary/fecal incontinence or tongue biting.  Unsure if hit head and unsure if passed out.  Denies f/c, cough, cp, sob, abd pain, n/v/d, dysuria, hematuria.  Typically walks with walker.      VS notable for /75  Exam notable for ttp to R shoulder, otherwise grossly unrevealing    Suspect pt had episode of Meniere's "attack", does not sound c/w seizure.  Could have had syncopal episode  Will get EKG, basic labs, CXR, UA  Will get CTH to r/o ICH in setting of eliquis use and unclear if hit head  Will get XR R shoulder, R humerus  No other signs trauma/injury  Pain meds prn  Re-eval

## 2023-01-31 NOTE — ED PROVIDER NOTE - PATIENT PORTAL LINK FT
You can access the FollowMyHealth Patient Portal offered by [facility] by registering at the following website: [email or web address]. By joining [facility]’s FollowMyHealth portal, you will also be able to view your health information using other applications (apps) compatible with our system.

## 2023-01-31 NOTE — ED PROVIDER NOTE - NS ED ROS FT
CONSTITUTIONAL: Denies fever and chills    HEENT: Denies cough    RESPIRATORY: Denies SOB    CARDIOVASCULAR: Denies palpitations and chest pain.    GASTROINTESTINAL: Denies abdominal pain, nausea, vomiting and diarrhea    GENITOURINARY: Denies dysuria and hematuria.    MUSCULOSKELETAL: R shoulder pain, L knee pain    NEUROLOGICAL: Denies headache     PSYCHIATRIC: Denies recent changes in mood. Denies anxiety and depression.

## 2023-01-31 NOTE — ED ADULT TRIAGE NOTE - CHIEF COMPLAINT QUOTE
A&ox4 BIBA c.p syncope. PT states " I was reading on the side of my bed and then I ended up on the floor, I don't know how I go there. PMH of Meniere's disease, afib.

## 2023-01-31 NOTE — ED PROVIDER NOTE - ATTENDING CONTRIBUTION TO CARE
71 yo with afib s/p ablation, Meniere's, HLD, L knee replacement in [facility] 12/13/23 w dizziness/falls pta today, pt states related to Menieres, no cp/sob, abd pain, n/v/d, pending labs, imaging to eval for traumatic injury, reassess.

## 2023-01-31 NOTE — ED ADULT NURSE NOTE - OBJECTIVE STATEMENT
The patient is a 72y Female complaining of syncope. Hx of Meniere's disease, A-fib.  Pt reports " I was reading on the sided of my bed and I felt like I was jolted to the other side of the room, and woke up on the floor", +LOC, unknown head strike, pt on Eliquis. Pt reports R sided shoulder pain with movement. No obvious injury/bleeding noted. Pt denies CP, SOB, HA, change sin vision, back pain, neck pain, abd pain.

## 2023-02-01 ENCOUNTER — APPOINTMENT (OUTPATIENT)
Dept: OTOLARYNGOLOGY | Facility: CLINIC | Age: 73
End: 2023-02-01
Payer: MEDICARE

## 2023-02-01 PROCEDURE — 99212 OFFICE O/P EST SF 10 MIN: CPT

## 2023-02-01 NOTE — ASSESSMENT
[FreeTextEntry1] : Unusual falling event of uncertain etiology.  This was evaluated in the emergency department.  She was essentially cleared of cardiovascular or neurological events.  This may represent a drop attack.\par \par I have recommended continued use of betahistine and follow-up in the next few weeks with repeat audiometry.

## 2023-02-06 ENCOUNTER — NON-APPOINTMENT (OUTPATIENT)
Age: 73
End: 2023-02-06

## 2023-02-07 PROBLEM — H81.09 MENIERE'S DISEASE, UNSPECIFIED EAR: Chronic | Status: ACTIVE | Noted: 2023-01-31

## 2023-02-07 PROBLEM — E78.5 HYPERLIPIDEMIA, UNSPECIFIED: Chronic | Status: ACTIVE | Noted: 2023-01-31

## 2023-02-07 PROBLEM — I48.91 UNSPECIFIED ATRIAL FIBRILLATION: Chronic | Status: ACTIVE | Noted: 2023-01-31

## 2023-02-08 ENCOUNTER — APPOINTMENT (OUTPATIENT)
Dept: OTOLARYNGOLOGY | Facility: CLINIC | Age: 73
End: 2023-02-08
Payer: MEDICARE

## 2023-02-08 VITALS — HEIGHT: 61 IN | WEIGHT: 145 LBS | BODY MASS INDEX: 27.38 KG/M2

## 2023-02-08 DIAGNOSIS — H91.93 UNSPECIFIED HEARING LOSS, BILATERAL: ICD-10-CM

## 2023-02-08 PROCEDURE — 92567 TYMPANOMETRY: CPT

## 2023-02-08 PROCEDURE — 92556 SPEECH AUDIOMETRY COMPLETE: CPT

## 2023-02-08 PROCEDURE — 99214 OFFICE O/P EST MOD 30 MIN: CPT | Mod: 25

## 2023-02-08 PROCEDURE — 92552 PURE TONE AUDIOMETRY AIR: CPT

## 2023-02-08 PROCEDURE — G0268 REMOVAL OF IMPACTED WAX MD: CPT

## 2023-02-08 RX ORDER — TRIAMTERENE AND HYDROCHLOROTHIAZIDE 25; 37.5 MG/1; MG/1
37.5-25 TABLET ORAL DAILY
Qty: 90 | Refills: 3 | Status: DISCONTINUED | COMMUNITY
Start: 2021-08-06 | End: 2023-02-08

## 2023-02-08 RX ORDER — ALPRAZOLAM 0.5 MG/1
0.5 TABLET ORAL
Qty: 15 | Refills: 0 | Status: DISCONTINUED | COMMUNITY
Start: 2021-01-29 | End: 2023-02-08

## 2023-02-08 RX ORDER — BUDESONIDE AND FORMOTEROL FUMARATE DIHYDRATE 160; 4.5 UG/1; UG/1
160-4.5 AEROSOL RESPIRATORY (INHALATION)
Qty: 31 | Refills: 0 | Status: DISCONTINUED | COMMUNITY
Start: 2021-02-19 | End: 2023-02-08

## 2023-02-08 RX ORDER — FOLIC ACID 1 MG/1
1 TABLET ORAL
Qty: 30 | Refills: 0 | Status: DISCONTINUED | COMMUNITY
Start: 2020-12-28 | End: 2023-02-08

## 2023-02-08 RX ORDER — MECLIZINE HYDROCHLORIDE 12.5 MG/1
12.5 TABLET ORAL 3 TIMES DAILY
Qty: 30 | Refills: 1 | Status: ACTIVE | COMMUNITY
Start: 2021-08-05 | End: 1900-01-01

## 2023-02-08 RX ORDER — LORAZEPAM 0.5 MG/1
0.5 TABLET ORAL
Qty: 20 | Refills: 0 | Status: DISCONTINUED | COMMUNITY
Start: 2021-11-15 | End: 2023-02-08

## 2023-02-08 NOTE — PHYSICAL EXAM
[FreeTextEntry1] : Microscopic ear exam with cerumen debridement:\par \par Right ear: Obstructing cerumen was debrided from the ear canal using suction, and curet.  The ear canal was otherwise within normal limits.  The tympanic membrane was intact and noninflamed.\par \par Left ear: Obstructing cerumen was debrided from the ear canal using suction, and curets.  The ear canal was otherwise within normal limits.  The tympanic membrane was intact and noninflamed. [Normal] : no rashes

## 2023-02-08 NOTE — DATA REVIEWED
[de-identified] : In light of the patients current symptoms, Complete audiometry was ordered and completed today. I have interpreted these results and reviewed them in detail with the patient.\par \par Severe sensorineural hearing loss with poor speech recognition in the left ear.  Bone-conduction was not done due to head injury

## 2023-02-08 NOTE — HISTORY OF PRESENT ILLNESS
[de-identified] : WILLIAM CENTENO has a history of Meniere's disease for several years in or about 2020.  Episodic vertigo began. \par No tinnitus. No hearing fluctuations\par \par reports negative MRI recently\par FH: sister has Meniere's disease [FreeTextEntry1] : 02/08/2023 \par Acute vertigo several days ago lasting minutes to hours. 2 days later developed a 'propelled' sensation pushing her to the floor from sitting on the bed. No associated ear symptoms, except possible ear fullness.  ED visit with scans, cleared  from neuro prospective. Treated for scalp injury.  She has now experienced 3 drop attacks.

## 2023-02-08 NOTE — CONSULT LETTER
[Please see my note below.] : Please see my note below. [FreeTextEntry2] : Dear DENNY NORMAN  [FreeTextEntry1] : Thank you for allowing me to participate in the care of WILLIAM CENTENO .\par Please see the attached visit note.\par \par \par \par Sha Young\par Otology\par Medical Director of [facility]\par Department of Otolaryngology\par [facility]

## 2023-02-10 PROBLEM — H91.93 UNSPECIFIED HEARING LOSS, BILATERAL: Status: ACTIVE | Noted: 2023-02-10

## 2023-02-16 ENCOUNTER — APPOINTMENT (OUTPATIENT)
Dept: OTOLARYNGOLOGY | Facility: CLINIC | Age: 73
End: 2023-02-16

## 2023-02-16 ENCOUNTER — NON-APPOINTMENT (OUTPATIENT)
Age: 73
End: 2023-02-16

## 2023-07-02 ENCOUNTER — NON-APPOINTMENT (OUTPATIENT)
Age: 73
End: 2023-07-02

## 2023-07-06 ENCOUNTER — NON-APPOINTMENT (OUTPATIENT)
Age: 73
End: 2023-07-06

## 2023-07-26 ENCOUNTER — APPOINTMENT (OUTPATIENT)
Dept: OTOLARYNGOLOGY | Facility: CLINIC | Age: 73
End: 2023-07-26
Payer: MEDICARE

## 2023-07-26 PROCEDURE — 99212 OFFICE O/P EST SF 10 MIN: CPT | Mod: 95

## 2023-07-26 NOTE — DATA REVIEWED
[de-identified] : Previous audiometry reviewed
Yes
I have personally performed a face to face diagnostic evaluation on this patient. I have reviewed the ACP note and agree with the history, exam and plan of care, except as noted.

## 2023-07-26 NOTE — HISTORY OF PRESENT ILLNESS
[Home] : at home, [unfilled] , at the time of the visit. [Medical Office: ([facility])___] : at the medical office located in  [Verbal consent obtained from patient] : the patient, [unfilled] [de-identified] : WILLIAM CENTENO has a history of Meniere's disease for several years in or about 2020.  Episodic vertigo began. \par No tinnitus. No hearing fluctuations\par \par reports negative MRI recently\par FH: sister has Meniere's disease [FreeTextEntry1] : 07/26/2023 \par no more drop attacks. having difficulty with balance and mobility no vertigo reported.  No tinnitus reported.  No perceived change in hearing.  Using bilateral amplification.

## 2023-07-26 NOTE — ASSESSMENT
[FreeTextEntry1] : Improved symptoms of Ménière's disease causing hearing loss and vertigo.  She continues on betahistine.  She is recovering from knee replacement surgery and has had mobility issues.\par \par I have encouraged her to continue with balance training physical therapy and lower extremity physical therapy.  I have recommended continuing betahistine medication.\par \par When possible, I have recommended a follow-up in person visit for audiometry.

## 2023-11-01 ENCOUNTER — APPOINTMENT (OUTPATIENT)
Dept: NEPHROLOGY | Facility: CLINIC | Age: 73
End: 2023-11-01
Payer: MEDICARE

## 2023-11-01 VITALS
BODY MASS INDEX: 27.48 KG/M2 | HEIGHT: 60 IN | WEIGHT: 140 LBS | DIASTOLIC BLOOD PRESSURE: 72 MMHG | HEART RATE: 68 BPM | SYSTOLIC BLOOD PRESSURE: 128 MMHG

## 2023-11-01 DIAGNOSIS — N18.9 CHRONIC KIDNEY DISEASE, UNSPECIFIED: ICD-10-CM

## 2023-11-01 DIAGNOSIS — I48.0 PAROXYSMAL ATRIAL FIBRILLATION: ICD-10-CM

## 2023-11-01 DIAGNOSIS — N18.31 CHRONIC KIDNEY DISEASE, STAGE 3A: ICD-10-CM

## 2023-11-01 DIAGNOSIS — R73.03 PREDIABETES.: ICD-10-CM

## 2023-11-01 DIAGNOSIS — E78.00 PURE HYPERCHOLESTEROLEMIA, UNSPECIFIED: ICD-10-CM

## 2023-11-01 DIAGNOSIS — D63.1 CHRONIC KIDNEY DISEASE, UNSPECIFIED: ICD-10-CM

## 2023-11-01 DIAGNOSIS — I10 ESSENTIAL (PRIMARY) HYPERTENSION: ICD-10-CM

## 2023-11-01 PROCEDURE — 99204 OFFICE O/P NEW MOD 45 MIN: CPT

## 2024-01-05 ENCOUNTER — NON-APPOINTMENT (OUTPATIENT)
Age: 74
End: 2024-01-05

## 2024-03-14 ENCOUNTER — APPOINTMENT (OUTPATIENT)
Dept: OTOLARYNGOLOGY | Facility: CLINIC | Age: 74
End: 2024-03-14
Payer: MEDICARE

## 2024-03-14 DIAGNOSIS — H90.A21 SENSORINEURAL HEARING LOSS, UNILATERAL, RIGHT EAR, WITH RESTRICTED HEARING ON THE CONTRALATERAL SIDE: ICD-10-CM

## 2024-03-14 DIAGNOSIS — H81.02 MENIERE'S DISEASE, LEFT EAR: ICD-10-CM

## 2024-03-14 DIAGNOSIS — H61.20 IMPACTED CERUMEN, UNSPECIFIED EAR: ICD-10-CM

## 2024-03-14 DIAGNOSIS — H90.3 SENSORINEURAL HEARING LOSS, BILATERAL: ICD-10-CM

## 2024-03-14 PROCEDURE — 92567 TYMPANOMETRY: CPT

## 2024-03-14 PROCEDURE — 92557 COMPREHENSIVE HEARING TEST: CPT

## 2024-03-14 PROCEDURE — 99214 OFFICE O/P EST MOD 30 MIN: CPT | Mod: 25

## 2024-03-14 PROCEDURE — G0268 REMOVAL OF IMPACTED WAX MD: CPT

## 2024-03-14 NOTE — HISTORY OF PRESENT ILLNESS
[FreeTextEntry1] :  03/14/2024 No vertigo since approximately early 2023. Still using Betahistine medication with perceived benefit.  Uncertain change in hearing.  Using bilateral amplification with good benefit. [de-identified] : WILLIAM CENTENO has a history of Meniere's disease for several years in or about 2020.  Episodic vertigo began.  No tinnitus. No hearing fluctuations  reports negative MRI recently FH: sister has Meniere's disease

## 2024-03-14 NOTE — DATA REVIEWED
[de-identified] : In light of the patients current symptoms, Complete audiometry was ordered and completed today. I have interpreted these results and reviewed them in detail with the patient.  Asymmetric sensorineural hearing loss affecting the left ear greater than the right.  This appears somewhat improved from prior testing

## 2024-03-14 NOTE — ASSESSMENT
[FreeTextEntry1] : Significantly improved symptoms related to Meniere's disease in the left ear.  This has previously caused drop attacks and vertigo.  She feels that betahistine has significantly improved her symptoms.  We discussed the option of continuing this medication.  Monitoring advised.  Follow-up with audiologist as needed for hearing technology optimization.  Follow-up recommended in 6 months with repeat audiometry.

## 2024-03-27 PROBLEM — H90.3 ASYMMETRIC SNHL (SENSORINEURAL HEARING LOSS): Status: ACTIVE | Noted: 2021-12-22

## 2024-06-05 ENCOUNTER — APPOINTMENT (OUTPATIENT)
Dept: ORTHOPEDIC SURGERY | Facility: CLINIC | Age: 74
End: 2024-06-05
Payer: MEDICARE

## 2024-06-05 DIAGNOSIS — Z78.9 OTHER SPECIFIED HEALTH STATUS: ICD-10-CM

## 2024-06-05 DIAGNOSIS — Z87.898 PERSONAL HISTORY OF OTHER SPECIFIED CONDITIONS: ICD-10-CM

## 2024-06-05 DIAGNOSIS — Z82.61 FAMILY HISTORY OF ARTHRITIS: ICD-10-CM

## 2024-06-05 DIAGNOSIS — R26.9 UNSPECIFIED ABNORMALITIES OF GAIT AND MOBILITY: ICD-10-CM

## 2024-06-05 DIAGNOSIS — Z80.9 FAMILY HISTORY OF MALIGNANT NEOPLASM, UNSPECIFIED: ICD-10-CM

## 2024-06-05 DIAGNOSIS — M25.532 PAIN IN LEFT WRIST: ICD-10-CM

## 2024-06-05 DIAGNOSIS — Z60.2 PROBLEMS RELATED TO LIVING ALONE: ICD-10-CM

## 2024-06-05 DIAGNOSIS — M65.4 RADIAL STYLOID TENOSYNOVITIS [DE QUERVAIN]: ICD-10-CM

## 2024-06-05 DIAGNOSIS — Z96.652 PAIN DUE TO INTERNAL ORTHOPEDIC PROSTHETIC DEVICES, IMPLANTS AND GRAFTS, INITIAL ENCOUNTER: ICD-10-CM

## 2024-06-05 DIAGNOSIS — M17.11 UNILATERAL PRIMARY OSTEOARTHRITIS, RIGHT KNEE: ICD-10-CM

## 2024-06-05 DIAGNOSIS — T84.84XA PAIN DUE TO INTERNAL ORTHOPEDIC PROSTHETIC DEVICES, IMPLANTS AND GRAFTS, INITIAL ENCOUNTER: ICD-10-CM

## 2024-06-05 PROCEDURE — 73562 X-RAY EXAM OF KNEE 3: CPT | Mod: 50

## 2024-06-05 PROCEDURE — 73110 X-RAY EXAM OF WRIST: CPT | Mod: LT

## 2024-06-05 PROCEDURE — 99204 OFFICE O/P NEW MOD 45 MIN: CPT

## 2024-06-05 RX ORDER — APIXABAN 5 MG/1
5 TABLET, FILM COATED ORAL
Qty: 180 | Refills: 0 | Status: COMPLETED | COMMUNITY
Start: 2020-12-01 | End: 2024-06-05

## 2024-06-05 RX ORDER — METOPROLOL SUCCINATE 50 MG/1
50 TABLET, EXTENDED RELEASE ORAL
Qty: 90 | Refills: 0 | Status: COMPLETED | COMMUNITY
Start: 2021-01-19 | End: 2024-06-05

## 2024-06-05 SDOH — SOCIAL STABILITY - SOCIAL INSECURITY: PROBLEMS RELATED TO LIVING ALONE: Z60.2

## 2024-06-05 NOTE — ASSESSMENT
[FreeTextEntry1] : 72 y/o female 1-1/2 years following left knee replacement complicated postoperatively by pneumonia and hospitalization in the ICU prolonging her rehab and delaying rehab which probably led to more stiffness and weakness that she had to overcome. At this point the replacement appears to be satisfactory with satisfactory motion.  There is some flexion stiffness.  It does not seem like there is any evidence of infection in the knee or severe pain like an infection at all.  Her wound had healed fine from her history. She has more pain in the right knee where there is severe arthritis but now she is fearful of considering knee replacement on the right. I think she needs more time to build back her confidence and strength. We can consider injections in the right knee if her pain if needed. Otherwise she can take Tylenol for pain She is doing cognitive behavioral therapy which hopefully will help with her confidence.  She has had a history of drop attacks and so she has a fear of having that happen again.  It has been over 6 months since she has had any and she has had a cardiology workup and ENT workup and was told that she is fine.  She is going to be seeing a neurologist which I think is good. I do see some labs from last year which show elevated sedimentation rate.  I may want to repeat that next visit if she is not doing better but I will see what the neurologist evaluation reveals.  Certainly her knee does not appear to have any infection there is no significant swelling but we may want to repeat the inflammatory markers. She can use heat and ice as needed. I tried to reassure her that the knee looks good and I think she could have a good result. For her left wrist she does seem to have de Quervain's tenosynovitis.  She has tried injections and bracing.  She can use Voltaren gel which may help.  Heat and ice as needed. She should go back to the hand specialist.  Ongoing pain then surgery may be considered.

## 2024-06-05 NOTE — HISTORY OF PRESENT ILLNESS
[de-identified] :  Ms. Meeks is a 73 year old female who comes in for evaluation for LEFT knee pain. She is 1.5 years s/p TKR. She has continued to have pain which is relatively mild but more issue is with her gait and balance and difficulty walking which she does not fully understand. She had the surgery at [facility] but does not want to follow up with surgeon.  She never had any infection after surgery or wound healing problems. She was sent home in a day or 2 after her surgery was done and then was found at home with pneumonia and had to be admitted to the ICU and was very sick.  This delayed her rehab.  She was sent to a subacute rehab for a few weeks when she was discharged from the hospital and then get home therapy.  She feels that the therapy really was not very good.  She also has had drop attacks and vertigo in the past although none in the last 6 months.  This has made her very nervous about her risk of falling and getting injured.  She has done a lot of therapy over the last year but still struggles when walking.  She has a walker and a cane.  She is doing cognitive behavioral therapy as well for the last 6 months with minimal improvement. Left knee pain is about 1-2 out of 10 worse getting up with stiffness. She has arthritis and pain in her right knee but does not want to consider knee replacement. She is also having radial sided left wrist pain that somewhat chronic.  She has seen Dr. Eaton for this in the past.  She has had several injections and has used a splint for it.

## 2024-06-05 NOTE — PHYSICAL EXAM
[LE] : Sensory: Intact in bilateral lower extremities [Normal RLE] : Right Lower Extremity: No scars, rashes, lesions, ulcers, skin intact [Normal LLE] : Left Lower Extremity: No scars, rashes, lesions, ulcers, skin intact [Normal Touch] : sensation intact for touch [Normal] : Oriented to person, place, and time, insight and judgement were intact and the affect was normal [Cane] : ambulates with cane [Walker] : ambulates with walker [de-identified] : Bilateral knees Well-healed left knee incisions left knee. Left knee range of motion is 0 to about 110-115 degrees flexion with stiffness between 90 and 115 degrees. Normal varus and valgus and AP stability. Right knee range of motion is 5 -115 degrees with pain and crepitus. Tender right knee medial joint line.  Varus alignment right knee. Some mild tenderness generally around the left knee but no significant point tenderness. Trace effusion left knee. Intact extensor mechanism. Mild quad atrophy. She does walk with a very stiff gait No cogwheel rigidity.  Sensation intact in her feet.  Intact anterior tibial tendon, gastrocsoleus, peroneals, posterior tibial tendon, EHL.  Left wrist Tender over the radial side of the radial styloid/first extensor tendon compartment. Positive Finkelstein's. Pain with ulnar deviation. Wrist dorsiflexion to about 70 degrees and palmar flexion 80 degrees.  Motor and sensation are intact. [de-identified] :   X-rays ordered, performed and reviewed today of bilateral knees for knee pain weightbearing 4 views showed left knee prosthesis in satisfactory position without evidence of loosening. Right knee is with severe medial joint space narrowing and osteophytes consistent with KL grade 4 osteoarthritis.  Mild degenerative changes right patellofemoral joint.  X-rays ordered today of the left wrist AP, lateral and oblique views show bone protrusion near the radial styloid.  No fractures.  No significant CMC arthrosis

## 2024-07-17 ENCOUNTER — APPOINTMENT (OUTPATIENT)
Dept: ORTHOPEDIC SURGERY | Facility: CLINIC | Age: 74
End: 2024-07-17
Payer: MEDICARE

## 2024-07-17 DIAGNOSIS — M65.4 RADIAL STYLOID TENOSYNOVITIS [DE QUERVAIN]: ICD-10-CM

## 2024-07-17 DIAGNOSIS — M17.11 UNILATERAL PRIMARY OSTEOARTHRITIS, RIGHT KNEE: ICD-10-CM

## 2024-07-17 DIAGNOSIS — M25.462 EFFUSION, LEFT KNEE: ICD-10-CM

## 2024-07-17 DIAGNOSIS — Z96.652 PAIN DUE TO INTERNAL ORTHOPEDIC PROSTHETIC DEVICES, IMPLANTS AND GRAFTS, INITIAL ENCOUNTER: ICD-10-CM

## 2024-07-17 DIAGNOSIS — T84.84XA PAIN DUE TO INTERNAL ORTHOPEDIC PROSTHETIC DEVICES, IMPLANTS AND GRAFTS, INITIAL ENCOUNTER: ICD-10-CM

## 2024-07-17 PROCEDURE — 99214 OFFICE O/P EST MOD 30 MIN: CPT

## 2024-07-29 ENCOUNTER — APPOINTMENT (OUTPATIENT)
Dept: ORTHOPEDIC SURGERY | Facility: CLINIC | Age: 74
End: 2024-07-29
Payer: MEDICARE

## 2024-07-29 VITALS
DIASTOLIC BLOOD PRESSURE: 77 MMHG | OXYGEN SATURATION: 92 % | WEIGHT: 150 LBS | HEIGHT: 60 IN | SYSTOLIC BLOOD PRESSURE: 137 MMHG | HEART RATE: 60 BPM | BODY MASS INDEX: 29.45 KG/M2

## 2024-07-29 DIAGNOSIS — M25.462 EFFUSION, LEFT KNEE: ICD-10-CM

## 2024-07-29 DIAGNOSIS — Z96.652 PAIN DUE TO INTERNAL ORTHOPEDIC PROSTHETIC DEVICES, IMPLANTS AND GRAFTS, INITIAL ENCOUNTER: ICD-10-CM

## 2024-07-29 DIAGNOSIS — T84.84XA PAIN DUE TO INTERNAL ORTHOPEDIC PROSTHETIC DEVICES, IMPLANTS AND GRAFTS, INITIAL ENCOUNTER: ICD-10-CM

## 2024-07-29 DIAGNOSIS — Z86.79 PERSONAL HISTORY OF OTHER DISEASES OF THE CIRCULATORY SYSTEM: ICD-10-CM

## 2024-07-29 PROCEDURE — 99213 OFFICE O/P EST LOW 20 MIN: CPT

## 2024-07-29 RX ORDER — FLECAINIDE ACETATE 50 MG/1
TABLET ORAL
Refills: 0 | Status: ACTIVE | COMMUNITY

## 2024-07-29 RX ORDER — APIXABAN 5 MG/1
TABLET, FILM COATED ORAL
Refills: 0 | Status: ACTIVE | COMMUNITY

## 2024-07-29 NOTE — CONSULT LETTER
[FreeTextEntry2] : Dr. Torres [FreeTextEntry1] : I had the privilege of evaluating your patient today. I have enclosed my office note for your reference. If you have any questions, concerns or further input, please do not hesitate to contact me.  Thank you for allowing me to participate in the care of your patient.  Sincerely, Oniel Contreras MD

## 2024-07-29 NOTE — PHYSICAL EXAM
[de-identified] : General: Patient is a well-appearing female in no apparent distress. She is alert and oriented x 3. Vital signs are within normal limits.  No sign of fevers or infectious symptoms.   Hygiene: Excellent HEENT: Atraumatic with no asymmetry.  Neck motion is normal. No overt hearing deficits. Pulmonary: Breathing comfortably. Gastrointestinal: Is not obese.   Psych: Responding appropriately with appropriate affect. Patient does not demonstrate tangential thought, perseveration or anxiety. Vascular: No rashes or obvious skin abnormalities in either lower extremity. Capillary refill is <2sec. Good distal perfusion. Neurovascular: Varicose veins absent. 5/5 strength with hip flexion, knee extension, ankle dorsiflexion, ankle plantar flexion, and EHL. Sensation is intact over the lower extremity in L2-S1 nerve distributions. 2+ dorsalis pedis and posterior tibial pulses   [de-identified] : General: AxO x 3   She is ambulating with a wide-based shuffling gait and can walk without the walker but is typically using walker to help with balance  Neuro: 5/5 strength with foot eversion, foot inversion, dorsiflexion, plantar flexion, sensation is intact over the lower extremity in L2-S1 nerve distributions. 2+ dorsalis pedis and posterior tibial pulses. Negative Homans sign   Skin: incision well healed, no signs of infection  Knee: ROM: 0-125, No AP or VV instability  [de-identified] : 3 views of bilateral knees taken the outside reviewed.  There is a left knee arthroplasty with no obvious hardware complication loosening fracture or dislocation.  There is severe arthritis of the right knee with bone-on-bone apposition medial compartment

## 2024-07-29 NOTE — REVIEW OF SYSTEMS
[Joint Stiffness] : joint stiffness [Joint Swelling] : joint swelling [Negative] : Heme/Lymph [Fever] : no fever [Chills] : no chills

## 2024-07-29 NOTE — DISCUSSION/SUMMARY
[de-identified] : S/P left total knee replacement, with right knee arthritis.  She continues to feel that her gait has been impacted from the surgery and that there is a bandlike feeling around the knee.  She has been open neurology workup.  Her gait to me is suggestive of more significant issue than an isolated knee issue we discussed that at length.  I do not see any obvious hardware complication or issue with the knee on exam that would respond well to surgical treatment.  We discussed her right knee arthritis as well.  Symptomatic and pain relief, range of motion, activity advancement and precaution strategies reviewed, including use of over-the-counter medications as needed.  Encouraged her to continue with her neurology workup.  I did recommend yearly surveillance follow-up of her knee arthroplasty.  I am Happy to see her back at any time if there is any significant change.  All questions answered.   I, Dr. Contreras, personally performed the evaluation and management (E/M) services for this patient. That E/M includes conducting the clinically appropriate initial history &/or exam, assessing all conditions, and establishing the plan of care. Today, my JAZYZ, Mary Mcdaniels, was here to observe my evaluation and management service for this patient & follow plan of care established by me going forward.

## 2024-07-29 NOTE — HISTORY OF PRESENT ILLNESS
[de-identified] : WILLIAM CENTENO is a pleasant 73 year female . WILLIAM CENTENO is s/p left knee replacement done 1.5 years ago at [facility] with Dr. Geronimo. Her post op course was complicated by pneumonia requiring an ICU stay right after surgery which delayed her ability to participate in PT. She then had an NOBLE 3-4 months after surgery due to ongoing stiffness which she still feels today. She has discomfort and tightness around the left knee when walking. She has significant issues walking more than a few steps and has been using a walker since surgery. She feels her balance is off as well. She also notes several Meniere's disease episodes with drop attacks and falling over the last year. She's been seeing a Neurologist for workup regarding the stiffness and gait/balance issues and all tests have returned normal. She will be going for a Parkinson's disease work up next per Neuro. She also notes she will be going for CBT to determine if walking issues are psychological. She's very frustrated by her inability to walk as she used to and notes she was very active prior to the knee replacement. She also has right knee OA that has been flared up due to the issues with walking and after her left knee replacement. Denies any fevers and chills.

## 2024-07-29 NOTE — REASON FOR VISIT
[Initial Visit] : an initial visit for [FreeTextEntry2] : left knee tightness s/p left knee replacement

## 2024-08-23 ENCOUNTER — NON-APPOINTMENT (OUTPATIENT)
Age: 74
End: 2024-08-23

## 2024-09-12 ENCOUNTER — APPOINTMENT (OUTPATIENT)
Dept: OTOLARYNGOLOGY | Facility: CLINIC | Age: 74
End: 2024-09-12
Payer: MEDICARE

## 2024-09-12 DIAGNOSIS — H90.3 SENSORINEURAL HEARING LOSS, BILATERAL: ICD-10-CM

## 2024-09-12 DIAGNOSIS — H81.02 MENIERE'S DISEASE, LEFT EAR: ICD-10-CM

## 2024-09-12 DIAGNOSIS — H61.20 IMPACTED CERUMEN, UNSPECIFIED EAR: ICD-10-CM

## 2024-09-12 PROCEDURE — 99214 OFFICE O/P EST MOD 30 MIN: CPT | Mod: 25

## 2024-09-12 PROCEDURE — 92567 TYMPANOMETRY: CPT

## 2024-09-12 PROCEDURE — G0268 REMOVAL OF IMPACTED WAX MD: CPT

## 2024-09-12 PROCEDURE — 92557 COMPREHENSIVE HEARING TEST: CPT

## 2024-09-12 NOTE — DATA REVIEWED
[de-identified] : In light of the patients current symptoms, Complete audiometry was ordered and completed today. I have interpreted these results and reviewed them in detail with the patient.  Low-frequency sensorineural hearing loss left ear with poor speech recognition.  No significant change.

## 2024-09-12 NOTE — REASON FOR VISIT
[Hearing Loss] : hearing loss [Subsequent Evaluation] : a subsequent evaluation for [FreeTextEntry2] : Meniere's disease

## 2024-09-12 NOTE — ASSESSMENT
[FreeTextEntry1] : Improved symptoms from Meniere's disease in the left ear.  This has been sustained for several months now.  We discussed the option of discontinuing betahistine medication.  I have suggested halving the dose for 1 month.  She could then discontinue .  Follow-up recommended.

## 2024-09-12 NOTE — HISTORY OF PRESENT ILLNESS
[de-identified] :  WILLIAM CENTENO has a history of Meniere's disease for several years beginning in or about 2020. Episodic vertigo began. No tinnitus. No hearing fluctuations  reports negative MRI recently FH: sister has Meniere's disease   [FreeTextEntry1] : 09/12/2024 No vertigo. Continued imbalance. No hearing fluctuations. No tinnitus. Continues on betahistine.

## 2024-11-15 ENCOUNTER — NON-APPOINTMENT (OUTPATIENT)
Age: 74
End: 2024-11-15

## 2025-01-30 ENCOUNTER — APPOINTMENT (OUTPATIENT)
Dept: ORTHOPEDIC SURGERY | Facility: CLINIC | Age: 75
End: 2025-01-30
Payer: MEDICARE

## 2025-01-30 VITALS — WEIGHT: 150 LBS | RESPIRATION RATE: 16 BRPM | HEIGHT: 60 IN | BODY MASS INDEX: 29.45 KG/M2

## 2025-01-30 DIAGNOSIS — M65.4 RADIAL STYLOID TENOSYNOVITIS [DE QUERVAIN]: ICD-10-CM

## 2025-01-30 DIAGNOSIS — M65.332 TRIGGER FINGER, LEFT MIDDLE FINGER: ICD-10-CM

## 2025-01-30 PROCEDURE — 73130 X-RAY EXAM OF HAND: CPT | Mod: 50

## 2025-01-30 PROCEDURE — 73110 X-RAY EXAM OF WRIST: CPT | Mod: 50

## 2025-01-30 PROCEDURE — 99203 OFFICE O/P NEW LOW 30 MIN: CPT

## 2025-03-12 ENCOUNTER — APPOINTMENT (OUTPATIENT)
Dept: ORTHOPEDIC SURGERY | Facility: CLINIC | Age: 75
End: 2025-03-12

## 2025-03-12 DIAGNOSIS — M70.52 OTHER BURSITIS OF KNEE, LEFT KNEE: ICD-10-CM

## 2025-03-12 DIAGNOSIS — M17.11 UNILATERAL PRIMARY OSTEOARTHRITIS, RIGHT KNEE: ICD-10-CM

## 2025-03-12 DIAGNOSIS — M65.4 RADIAL STYLOID TENOSYNOVITIS [DE QUERVAIN]: ICD-10-CM

## 2025-03-12 DIAGNOSIS — T84.84XA PAIN DUE TO INTERNAL ORTHOPEDIC PROSTHETIC DEVICES, IMPLANTS AND GRAFTS, INITIAL ENCOUNTER: ICD-10-CM

## 2025-03-12 DIAGNOSIS — Z96.652 PAIN DUE TO INTERNAL ORTHOPEDIC PROSTHETIC DEVICES, IMPLANTS AND GRAFTS, INITIAL ENCOUNTER: ICD-10-CM

## 2025-03-12 PROCEDURE — 20610 DRAIN/INJ JOINT/BURSA W/O US: CPT | Mod: 59,RT

## 2025-03-12 PROCEDURE — 99214 OFFICE O/P EST MOD 30 MIN: CPT | Mod: 25

## 2025-03-12 PROCEDURE — 73562 X-RAY EXAM OF KNEE 3: CPT | Mod: 50

## 2025-03-14 ENCOUNTER — NON-APPOINTMENT (OUTPATIENT)
Age: 75
End: 2025-03-14

## 2025-04-17 ENCOUNTER — APPOINTMENT (OUTPATIENT)
Dept: ORTHOPEDIC SURGERY | Facility: CLINIC | Age: 75
End: 2025-04-17

## 2025-04-17 DIAGNOSIS — T84.84XA PAIN DUE TO INTERNAL ORTHOPEDIC PROSTHETIC DEVICES, IMPLANTS AND GRAFTS, INITIAL ENCOUNTER: ICD-10-CM

## 2025-04-17 DIAGNOSIS — Z96.652 PAIN DUE TO INTERNAL ORTHOPEDIC PROSTHETIC DEVICES, IMPLANTS AND GRAFTS, INITIAL ENCOUNTER: ICD-10-CM

## 2025-04-17 DIAGNOSIS — M65.4 RADIAL STYLOID TENOSYNOVITIS [DE QUERVAIN]: ICD-10-CM

## 2025-04-17 DIAGNOSIS — M65.332 TRIGGER FINGER, LEFT MIDDLE FINGER: ICD-10-CM

## 2025-04-17 DIAGNOSIS — M17.11 UNILATERAL PRIMARY OSTEOARTHRITIS, RIGHT KNEE: ICD-10-CM

## 2025-04-17 PROCEDURE — 20550 NJX 1 TENDON SHEATH/LIGAMENT: CPT | Mod: 59,LT

## 2025-04-17 PROCEDURE — 99213 OFFICE O/P EST LOW 20 MIN: CPT | Mod: 25

## 2025-08-25 ENCOUNTER — APPOINTMENT (OUTPATIENT)
Dept: ORTHOPEDIC SURGERY | Facility: CLINIC | Age: 75
End: 2025-08-25
Payer: MEDICARE

## 2025-08-25 ENCOUNTER — NON-APPOINTMENT (OUTPATIENT)
Age: 75
End: 2025-08-25

## 2025-08-25 DIAGNOSIS — M65.332 TRIGGER FINGER, LEFT MIDDLE FINGER: ICD-10-CM

## 2025-08-25 DIAGNOSIS — T84.84XA PAIN DUE TO INTERNAL ORTHOPEDIC PROSTHETIC DEVICES, IMPLANTS AND GRAFTS, INITIAL ENCOUNTER: ICD-10-CM

## 2025-08-25 DIAGNOSIS — M65.331 TRIGGER FINGER, RIGHT MIDDLE FINGER: ICD-10-CM

## 2025-08-25 DIAGNOSIS — Z96.652 PAIN DUE TO INTERNAL ORTHOPEDIC PROSTHETIC DEVICES, IMPLANTS AND GRAFTS, INITIAL ENCOUNTER: ICD-10-CM

## 2025-08-25 DIAGNOSIS — M79.662 PAIN IN LEFT LOWER LEG: ICD-10-CM

## 2025-08-25 PROCEDURE — 20610 DRAIN/INJ JOINT/BURSA W/O US: CPT | Mod: 59,RT

## 2025-08-25 PROCEDURE — 99213 OFFICE O/P EST LOW 20 MIN: CPT | Mod: 25

## 2025-08-25 RX ORDER — OXYCODONE 5 MG/1
5 TABLET ORAL
Qty: 14 | Refills: 0 | Status: ACTIVE | COMMUNITY
Start: 2025-08-25 | End: 1900-01-01

## 2025-08-26 ENCOUNTER — NON-APPOINTMENT (OUTPATIENT)
Age: 75
End: 2025-08-26

## 2025-08-29 ENCOUNTER — APPOINTMENT (OUTPATIENT)
Dept: ORTHOPEDIC SURGERY | Facility: CLINIC | Age: 75
End: 2025-08-29
Payer: MEDICARE

## 2025-08-29 DIAGNOSIS — M25.561 PAIN IN RIGHT KNEE: ICD-10-CM

## 2025-08-29 DIAGNOSIS — M17.11 UNILATERAL PRIMARY OSTEOARTHRITIS, RIGHT KNEE: ICD-10-CM

## 2025-08-29 PROCEDURE — 73560 X-RAY EXAM OF KNEE 1 OR 2: CPT | Mod: RT

## 2025-08-29 PROCEDURE — 99213 OFFICE O/P EST LOW 20 MIN: CPT
